# Patient Record
(demographics unavailable — no encounter records)

---

## 2018-05-11 NOTE — RAD
CHEST TWO VIEWS:

 

History: Flu symptoms. 

 

FINDINGS: 

Heart size and mediastinum within normal limits. The lungs are clear of infiltrates. NO significant b
mely findings. 

 

IMPRESSION: 

No active intrathoracic disease. 

 

POS: [facility]